# Patient Record
Sex: MALE | Race: WHITE | Employment: FULL TIME | URBAN - METROPOLITAN AREA
[De-identification: names, ages, dates, MRNs, and addresses within clinical notes are randomized per-mention and may not be internally consistent; named-entity substitution may affect disease eponyms.]

---

## 2017-01-20 ENCOUNTER — OFFICE VISIT (OUTPATIENT)
Dept: FAMILY MEDICINE CLINIC | Age: 47
End: 2017-01-20

## 2017-01-20 VITALS
TEMPERATURE: 96.3 F | RESPIRATION RATE: 12 BRPM | BODY MASS INDEX: 35.78 KG/M2 | SYSTOLIC BLOOD PRESSURE: 138 MMHG | HEIGHT: 73 IN | HEART RATE: 72 BPM | DIASTOLIC BLOOD PRESSURE: 88 MMHG | WEIGHT: 270 LBS

## 2017-01-20 DIAGNOSIS — I10 ESSENTIAL HYPERTENSION: ICD-10-CM

## 2017-01-20 DIAGNOSIS — R20.2 PARESTHESIA OF BOTH LEGS: Primary | ICD-10-CM

## 2017-01-20 PROCEDURE — 99213 OFFICE O/P EST LOW 20 MIN: CPT | Performed by: FAMILY MEDICINE

## 2017-03-07 RX ORDER — AMLODIPINE BESYLATE 10 MG/1
10 TABLET ORAL DAILY
Qty: 90 TABLET | Refills: 3 | Status: SHIPPED | OUTPATIENT
Start: 2017-03-07 | End: 2021-05-05

## 2017-04-21 RX ORDER — CLONIDINE HYDROCHLORIDE 0.2 MG/1
0.2 TABLET ORAL NIGHTLY
Qty: 90 TABLET | Refills: 1 | Status: SHIPPED | OUTPATIENT
Start: 2017-04-21 | End: 2021-05-05

## 2017-06-21 ENCOUNTER — TELEPHONE (OUTPATIENT)
Dept: GASTROENTEROLOGY | Age: 47
End: 2017-06-21

## 2017-09-06 ENCOUNTER — TELEPHONE (OUTPATIENT)
Dept: FAMILY MEDICINE CLINIC | Age: 47
End: 2017-09-06

## 2017-09-06 NOTE — TELEPHONE ENCOUNTER
Pt called in asking for information from his paper chart. Pt said that way back in the day Dr Sae Benitez had hand written a list of medication symptoms for him on a piece of blank copy paper and he would like a copy of it. Pt got up set when he was informed that we no longer have the paper charts on premises but that if needed we could request the chart be brought in. He asked that the chart be requested and that the paper list be found if possible. Pt was informed it could take up to 4 weeks to get the paper chart delivered to the office. Chart was requested today 9/6.  Pt can be reached at [phone]

## 2017-10-13 NOTE — TELEPHONE ENCOUNTER
Tried to call the pt. There is nothing in the pt's written chart that lists his medications and reactions to them. I have looked over the whole chart numerous times to make sure nothing was over looked. Left message advising pt of this. Chart was sent back.

## 2021-05-17 ENCOUNTER — INITIAL CONSULT (OUTPATIENT)
Dept: PULMONOLOGY | Age: 51
End: 2021-05-17
Payer: COMMERCIAL

## 2021-05-17 VITALS
HEIGHT: 73 IN | OXYGEN SATURATION: 98 % | BODY MASS INDEX: 39.36 KG/M2 | HEART RATE: 96 BPM | TEMPERATURE: 97.9 F | WEIGHT: 297 LBS | DIASTOLIC BLOOD PRESSURE: 86 MMHG | SYSTOLIC BLOOD PRESSURE: 148 MMHG

## 2021-05-17 DIAGNOSIS — E66.9 OBESITY (BMI 30-39.9): ICD-10-CM

## 2021-05-17 DIAGNOSIS — R40.0 DAYTIME SOMNOLENCE: ICD-10-CM

## 2021-05-17 DIAGNOSIS — Z90.89 H/O UVULECTOMY: ICD-10-CM

## 2021-05-17 DIAGNOSIS — Z99.89 OSA ON CPAP: Primary | ICD-10-CM

## 2021-05-17 DIAGNOSIS — G47.33 OSA ON CPAP: Primary | ICD-10-CM

## 2021-05-17 DIAGNOSIS — I10 ESSENTIAL HYPERTENSION: ICD-10-CM

## 2021-05-17 PROCEDURE — 99203 OFFICE O/P NEW LOW 30 MIN: CPT | Performed by: INTERNAL MEDICINE

## 2021-05-17 RX ORDER — CHOLECALCIFEROL (VITAMIN D3) 50 MCG
2000 TABLET ORAL DAILY
COMMUNITY

## 2021-05-17 RX ORDER — LORATADINE 10 MG/1
10 TABLET ORAL DAILY
COMMUNITY

## 2021-05-17 NOTE — PATIENT INSTRUCTIONS
Patient Education        Learning About Benefits From Quitting Smoking  How does quitting smoking make you healthier? If you're thinking about quitting smoking, you may have a few reasons to be smoke-free. Your health may be one of them. · When you quit smoking, you lower your risks for cancer, lung disease, heart attack, stroke, blood vessel disease, and blindness from macular degeneration. · When you're smoke-free, you get sick less often, and you heal faster. You are less likely to get colds, flu, bronchitis, and pneumonia. · As a nonsmoker, you may find that your mood is better and you are less stressed. When and how will you feel healthier? Quitting has real health benefits that start from day 1 of being smoke-free. And the longer you stay smoke-free, the healthier you get and the better you feel. The first hours  · After just 20 minutes, your blood pressure and heart rate go down. That means there's less stress on your heart and blood vessels. · Within 12 hours, the level of carbon monoxide in your blood drops back to normal. That makes room for more oxygen. With more oxygen in your body, you may notice that you have more energy than when you smoked. After 2 weeks  · Your lungs start to work better. · Your risk of heart attack starts to drop. After 1 month  · When your lungs are clear, you cough less and breathe deeper, so it's easier to be active. · Your sense of taste and smell return. That means you can enjoy food more than you have since you started smoking. Over the years  · Over the years, your risks of heart disease, heart attack, and stroke are lower. · After 10 years, your risk of dying from lung cancer is cut by about half. And your risk for many other types of cancer is lower too. How would quitting help others in your life? When you quit smoking, you improve the health of everyone who now breathes in your smoke. · Their heart, lung, and cancer risks drop, much like yours.   · They are sick less. For babies and small children, living smoke-free means they're less likely to have ear infections, pneumonia, and bronchitis. · If you're a woman who is or will be pregnant someday, quitting smoking means a healthier . · Children who are close to you are less likely to become adult smokers. Where can you learn more? Go to https://Envoimoinscherpegloewoksana.[employer]. org and sign in to your [employer] account. Enter [phone] in the [facility] box to learn more about \"Learning About Benefits From Quitting Smoking. \"     If you do not have an account, please click on the \"Sign Up Now\" link. Current as of: 2020               Content Version: 12.8  © 2783-2625 Healthwise, Incorporated. Care instructions adapted under license by [facility] ([facility]). If you have questions about a medical condition or this instruction, always ask your healthcare professional. [place] [address] any warranty or liability for your use of this information.

## 2021-05-17 NOTE — PROGRESS NOTES
New Sleep Patient H/P    Presentation:  Nhan Ybarra is referred by Karina Bills for  JAVON      Nhan Ybarra has JAVON on CPAP 12 cwp has poor recollection how long ago  By whom and where his diagnosis of JAVON was made, he believes his initial AHI was 26 but not sure. [place] [facility] for the last 2 years never formal f/u with sleep clinic. Buys supplies on line, tells me his insurance does not cover for JAVON related issues. Cannot sleep without his CPAP device, currently on his second PAP machine the original one stopped working and he was given a loaner but the vendor went out of business and Nhan Ybarra kept the loaner. Has had myringotomy, uvulectomy for a benign growth, HTN on Chlorthalidone   Comes with D/L ran by [facility]---4h compliance 100%, residual AHI 2.2, average nightly use 7h 15 minutes. CPAP 12 cwp, nasal pillows. 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 Time in Bed:   Bedtime: 11p.m. Awakens  6 a.m. Marino falls asleep in 10  minutes. Any awakenings? Yes  Difficulty Falling back to sleep? No  Symptoms began:  several years ago. Symptoms include: snoring, periods of not breathing, excessive daytime sleepiness, disrupted sleep, naps    Previous evaluation and treatment? Yes  Where? Does not remember  Which ones? PSG and PSG with C PAP titration    He denies any history of sleep walking or sleep talking. No history of seizures activity. No history suggestive of restless legs syndrome. No history of bruxism. No history of head injury. Naps:  Any naps? Yes and are they helpful Yes    Snoring and Apneas:  Do you snore or been told you a snore? Yes  How long have known about your snoring? years  Any witnessed apneas? No  Any awakenings with choking or gasping? Yes    Dreams:  Any recurring dreams? No  Hallucinations? No  Sleep Paralysis? No  Symptoms of Cataplexy?  No    Driving History:  Do you have a CDL or drive long distances for work? No  Any driving accidents in the past year? No  Any sleepiness while driving? No    Weight:  Any change in weight over the past year? Yes   How about past 5 years? Yes  How much? 25        Past Medical History:   Diagnosis Date    ED (erectile dysfunction)     Generalized anxiety disorder     Hyperlipidemia     Hypertension     Hypogonadism     Low testosterone     JAVON (obstructive sleep apnea)     uses CPAP     Tendonitis of shoulder, left        Past Surgical History:   Procedure Laterality Date    COLONOSCOPY  05/16/2011    HERNIA REPAIR  1977    LUMBAR DISC SURGERY      TONSILLECTOMY  1978       Social History     Tobacco Use    Smoking status: Current Every Day Smoker     Packs/day: 1.00    Smokeless tobacco: Never Used    Tobacco comment: trying to quit smokes on/off   Vaping Use    Vaping Use: Never used   Substance Use Topics    Alcohol use: Yes    Drug use: No       No Known Allergies    Current Outpatient Medications   Medication Sig Dispense Refill    chlorthalidone (HYGROTON) 25 MG tablet Take 25 mg by mouth daily Take 1.5 tablets daily. No current facility-administered medications for this visit. Family History   Problem Relation Age of Onset    Colon Cancer Other     Hypertension Mother     Colon Polyps Father     Stroke Father         Any family history of any sleep problems or any one in your family on CPAP? Yes    Social History     Tobacco Use    Smoking status: Current Every Day Smoker     Packs/day: 1.00    Smokeless tobacco: Never Used    Tobacco comment: trying to quit smokes on/off   Vaping Use    Vaping Use: Never used   Substance Use Topics    Alcohol use: Yes    Drug use: No     Caffeine Intake: How much soda (pop), coffee, tea, power drinks do you ingest per day? 2 per day. Employment History:  Where do you work?  for [employer]  What are your shifts?  Day shift Review of Systems:   General/Constitutional: No recent loss of weight or appetite changes. No fever or chills. HENT: Negative. Eyes: Negative. Upper respiratory tract: No nasal stuffiness or post nasal drip. Lower respiratory tract/ lungs: No cough or sputum production. No hemoptysis. Cardiovascular: No palpitations or chest pain. Gastrointestinal: No nausea or vomiting. Neurological: No focal neurologiacal weakness. Extremities: No edema. Musculoskeletal: No complaints. Genitourinary: No complaints. Hematological: Negative. Psychiatric/Behavioral: Negative. Skin: No itching. Physical Exam:    HEIGHTHeight: 6' 1\" (185.4 cm) WEIGHTWeight: 297 lb (134.7 kg)    BMI:  39.18 Neck Size: 17.5  Oxygen Sat: room air    SAQLI:58 ESS: 18   Vitals:   Vitals:    05/17/21 1033   BP: (!) 148/86   Pulse:    Temp:    SpO2:           Mallampati Score: uvula removed    Physical Exam :  Constitutional: BMI 39  HENT:   Head: Normocephalic and atraumatic. Mouth/Throat: Oropharynx is clear and moist. No oral thrush. Large tongue, crowded pharynx, mallampati class 3 , surgically removed uvula. Eyes: Conjunctivae are normal. PERRLA. No scleral icterus. Neck: Neck supple. No JVD present. No tracheal deviation present. 17.75 inch circumference. Cardiovascular: Normal rate, regular rhythm, normal heart sounds. No murmur heard. Pulmonary/Chest: Effort normal and breath sounds normal. No stridor. No respiratory distress. No wheezes. No rales. Abdominal: Soft. No distension. No tenderness. Musculoskeletal: Normal range of motion. Lymphadenopathy:  No cervical adenopathy. Neurological: Alert and oriented to person, place, and time. No focal deficits. Skin: Skin is warm and dry. Patient is not diaphoretic. Psychiatric: Normal behavior with normal mood and affect.     Diagnostic Data:    PAP Download:   Original or initial  AHI: n/a     Date of initial study: n/a    [x] Compliant  100%   []  Noncompliant 0 %     PAP Type CPAP   Level  [id number]   Avg Hrs/Day 7hrs 14min    Recorded compliance dates , 4/17/21-5/16/21  Total Hours: 7hrs 28min   Machine/Mfg: Resmed  Interface: Pillows    Provider:  []MATTIE  []Galina  []Yuniel  []Bentley         [][facility] []Other:     Assessment    Diagnosis Orders   1. JAVON on CPAP     2. Obesity (BMI 30-39.9)     3. H/O uvulectomy     4. Daytime somnolence     5. Essential hypertension           Plan     Linwood Mask would need a new PSG with split night protocol if his medical insurance is going to cover for JAVON related expenses. No immediate need to get a new study as his current set up is working properly although his PAP is several years old. 3 mo/DL reveals excellent compliance and control with current set up: CPAP 12 cwp, nasal pillows  Declines supplies as he gets them on line no prescription needed. Continued wt loss recommended. RTC 1 year with D/L, sooner if need be. We discussed various treatment options including positional therapy, OAT and PAP therapies along with the benefits and limitations of each. We also discussed the health risks with untreated JAVON. Educated on proper sleep hygiene measures. 30 minutes was spent on this visit with > 50% being face to face obtaining HPI, examining patient, reviewing test results, educating and coordinating a treatment plan. -He was advised to call [employer] regarding supplies if needed.  -He call my office for earlier appointment if needed for worsening of sleep symptoms.  -Estelle Goodpasture educated about my impression and plan. Patient verbalizes understanding.

## 2021-11-09 ENCOUNTER — HOSPITAL ENCOUNTER (OUTPATIENT)
Dept: ULTRASOUND IMAGING | Age: 51
Discharge: HOME | End: 2021-11-09
Payer: COMMERCIAL

## 2021-11-09 DIAGNOSIS — R60.9 EDEMA, UNSPECIFIED TYPE: ICD-10-CM

## 2021-11-09 PROCEDURE — 93971 EXTREMITY STUDY: CPT

## 2022-05-02 ENCOUNTER — HOSPITAL ENCOUNTER (OUTPATIENT)
Age: 52
Discharge: HOME | End: 2022-05-02
Payer: COMMERCIAL

## 2022-05-02 ENCOUNTER — HOSPITAL ENCOUNTER (OUTPATIENT)
Dept: GENERAL RADIOLOGY | Age: 52
Discharge: HOME | End: 2022-05-02
Payer: COMMERCIAL

## 2022-05-02 DIAGNOSIS — M25.562 ACUTE PAIN OF LEFT KNEE: ICD-10-CM

## 2022-05-02 DIAGNOSIS — M25.561 ACUTE PAIN OF RIGHT KNEE: ICD-10-CM

## 2022-05-02 PROCEDURE — 73564 X-RAY EXAM KNEE 4 OR MORE: CPT
